# Patient Record
Sex: FEMALE | Race: WHITE | Employment: STUDENT | URBAN - METROPOLITAN AREA
[De-identification: names, ages, dates, MRNs, and addresses within clinical notes are randomized per-mention and may not be internally consistent; named-entity substitution may affect disease eponyms.]

---

## 2023-12-04 ENCOUNTER — HOSPITAL ENCOUNTER (EMERGENCY)
Age: 28
Discharge: HOME | End: 2023-12-05
Attending: EMERGENCY MEDICINE
Payer: COMMERCIAL

## 2023-12-04 ENCOUNTER — APPOINTMENT (OUTPATIENT)
Dept: ULTRASOUND IMAGING | Age: 28
End: 2023-12-04
Payer: COMMERCIAL

## 2023-12-04 DIAGNOSIS — O20.0 THREATENED MISCARRIAGE: Primary | ICD-10-CM

## 2023-12-04 LAB
BILIRUB UR QL STRIP.AUTO: NEGATIVE
CLARITY UR REFRACT.AUTO: CLEAR
COLOR UR AUTO: YELLOW
GLUCOSE UR STRIP.AUTO-MCNC: 250 MG/DL
NITRITE UR QL STRIP.AUTO: NEGATIVE
PH UR STRIP.AUTO: 6.5 [PH] (ref 5–8)
PROT UR STRIP.AUTO-MCNC: NEGATIVE MG/DL
SP GR UR STRIP.AUTO: 1.02 (ref 1–1.03)
UROBILINOGEN UR STRIP.AUTO-MCNC: 0.2 MG/DL

## 2023-12-04 PROCEDURE — 87086 URINE CULTURE/COLONY COUNT: CPT | Performed by: EMERGENCY MEDICINE

## 2023-12-04 PROCEDURE — 81001 URINALYSIS AUTO W/SCOPE: CPT | Performed by: EMERGENCY MEDICINE

## 2023-12-04 PROCEDURE — 87086 URINE CULTURE/COLONY COUNT: CPT

## 2023-12-04 PROCEDURE — 81001 URINALYSIS AUTO W/SCOPE: CPT

## 2023-12-04 PROCEDURE — 99284 EMERGENCY DEPT VISIT MOD MDM: CPT

## 2023-12-04 PROCEDURE — 99285 EMERGENCY DEPT VISIT HI MDM: CPT

## 2023-12-04 PROCEDURE — 81015 MICROSCOPIC EXAM OF URINE: CPT

## 2023-12-04 PROCEDURE — 96374 THER/PROPH/DIAG INJ IV PUSH: CPT

## 2023-12-04 RX ORDER — CHOLECALCIFEROL (VITAMIN D3) 25 MCG
1 TABLET,CHEWABLE ORAL DAILY
COMMUNITY

## 2023-12-05 ENCOUNTER — APPOINTMENT (OUTPATIENT)
Dept: ULTRASOUND IMAGING | Age: 28
End: 2023-12-05
Attending: EMERGENCY MEDICINE
Payer: COMMERCIAL

## 2023-12-05 VITALS
HEIGHT: 63 IN | OXYGEN SATURATION: 98 % | RESPIRATION RATE: 18 BRPM | SYSTOLIC BLOOD PRESSURE: 124 MMHG | HEART RATE: 92 BPM | BODY MASS INDEX: 34.2 KG/M2 | TEMPERATURE: 99 F | DIASTOLIC BLOOD PRESSURE: 80 MMHG | WEIGHT: 193 LBS

## 2023-12-05 LAB
ANTIBODY SCREEN: NEGATIVE
B-HCG SERPL-ACNC: ABNORMAL MIU/ML
BASOPHILS # BLD AUTO: 0.05 X10(3) UL (ref 0–0.2)
BASOPHILS NFR BLD AUTO: 0.4 %
EOSINOPHIL # BLD AUTO: 0.09 X10(3) UL (ref 0–0.7)
EOSINOPHIL NFR BLD AUTO: 0.7 %
ERYTHROCYTE [DISTWIDTH] IN BLOOD BY AUTOMATED COUNT: 12.5 %
HCT VFR BLD AUTO: 36.2 %
HGB BLD-MCNC: 12.6 G/DL
IMM GRANULOCYTES # BLD AUTO: 0.09 X10(3) UL (ref 0–1)
IMM GRANULOCYTES NFR BLD: 0.7 %
LYMPHOCYTES # BLD AUTO: 1.67 X10(3) UL (ref 1–4)
LYMPHOCYTES NFR BLD AUTO: 13.3 %
MCH RBC QN AUTO: 29 PG (ref 26–34)
MCHC RBC AUTO-ENTMCNC: 34.8 G/DL (ref 31–37)
MCV RBC AUTO: 83.2 FL
MONOCYTES # BLD AUTO: 0.68 X10(3) UL (ref 0.1–1)
MONOCYTES NFR BLD AUTO: 5.4 %
NEUTROPHILS # BLD AUTO: 10.02 X10 (3) UL (ref 1.5–7.7)
NEUTROPHILS # BLD AUTO: 10.02 X10(3) UL (ref 1.5–7.7)
NEUTROPHILS NFR BLD AUTO: 79.5 %
PLATELET # BLD AUTO: 226 10(3)UL (ref 150–450)
RBC # BLD AUTO: 4.35 X10(6)UL
RH BLOOD TYPE: NEGATIVE
WBC # BLD AUTO: 12.6 X10(3) UL (ref 4–11)

## 2023-12-05 PROCEDURE — 85025 COMPLETE CBC W/AUTO DIFF WBC: CPT | Performed by: EMERGENCY MEDICINE

## 2023-12-05 PROCEDURE — 84702 CHORIONIC GONADOTROPIN TEST: CPT | Performed by: EMERGENCY MEDICINE

## 2023-12-05 PROCEDURE — 86900 BLOOD TYPING SEROLOGIC ABO: CPT | Performed by: EMERGENCY MEDICINE

## 2023-12-05 PROCEDURE — 76811 OB US DETAILED SNGL FETUS: CPT | Performed by: EMERGENCY MEDICINE

## 2023-12-05 PROCEDURE — 86850 RBC ANTIBODY SCREEN: CPT | Performed by: EMERGENCY MEDICINE

## 2023-12-05 PROCEDURE — 86901 BLOOD TYPING SEROLOGIC RH(D): CPT | Performed by: EMERGENCY MEDICINE

## 2023-12-05 PROCEDURE — 76801 OB US < 14 WKS SINGLE FETUS: CPT | Performed by: EMERGENCY MEDICINE

## 2023-12-05 NOTE — ED INITIAL ASSESSMENT (HPI)
Patient presents with vaginal bleeding/spotting. States she noticed clots today while wiping after using the restroom. Denies abd pain/cramping    13 Weeks pregnant.

## 2023-12-19 ENCOUNTER — HOSPITAL ENCOUNTER (EMERGENCY)
Facility: HOSPITAL | Age: 28
Discharge: HOME | End: 2023-12-19
Attending: EMERGENCY MEDICINE
Payer: COMMERCIAL

## 2023-12-19 VITALS
BODY MASS INDEX: 34 KG/M2 | OXYGEN SATURATION: 98 % | DIASTOLIC BLOOD PRESSURE: 70 MMHG | HEART RATE: 91 BPM | WEIGHT: 190 LBS | SYSTOLIC BLOOD PRESSURE: 126 MMHG | TEMPERATURE: 98 F | RESPIRATION RATE: 20 BRPM

## 2023-12-19 DIAGNOSIS — O03.9 MISCARRIAGE: Primary | ICD-10-CM

## 2023-12-19 LAB
ALBUMIN SERPL-MCNC: 3.2 G/DL (ref 3.4–5)
ALBUMIN/GLOB SERPL: 1 {RATIO} (ref 1–2)
ALP LIVER SERPL-CCNC: 72 U/L
ALT SERPL-CCNC: 24 U/L
ANION GAP SERPL CALC-SCNC: 6 MMOL/L (ref 0–18)
ANTIBODY SCREEN: POSITIVE
AST SERPL-CCNC: 11 U/L (ref 15–37)
BASOPHILS # BLD AUTO: 0.05 X10(3) UL (ref 0–0.2)
BASOPHILS NFR BLD AUTO: 0.4 %
BILIRUB SERPL-MCNC: 0.5 MG/DL (ref 0.1–2)
BUN BLD-MCNC: 4 MG/DL (ref 9–23)
CALCIUM BLD-MCNC: 8.9 MG/DL (ref 8.5–10.1)
CHLORIDE SERPL-SCNC: 108 MMOL/L (ref 98–112)
CO2 SERPL-SCNC: 23 MMOL/L (ref 21–32)
CREAT BLD-MCNC: 0.45 MG/DL
EGFRCR SERPLBLD CKD-EPI 2021: 135 ML/MIN/1.73M2 (ref 60–?)
EOSINOPHIL # BLD AUTO: 0.07 X10(3) UL (ref 0–0.7)
EOSINOPHIL NFR BLD AUTO: 0.5 %
ERYTHROCYTE [DISTWIDTH] IN BLOOD BY AUTOMATED COUNT: 12.2 %
GLOBULIN PLAS-MCNC: 3.3 G/DL (ref 2.8–4.4)
GLUCOSE BLD-MCNC: 102 MG/DL (ref 70–99)
HCT VFR BLD AUTO: 36.4 %
HGB BLD-MCNC: 12.7 G/DL
IMM GRANULOCYTES # BLD AUTO: 0.08 X10(3) UL (ref 0–1)
IMM GRANULOCYTES NFR BLD: 0.6 %
LYMPHOCYTES # BLD AUTO: 1.68 X10(3) UL (ref 1–4)
LYMPHOCYTES NFR BLD AUTO: 11.8 %
MCH RBC QN AUTO: 28.9 PG (ref 26–34)
MCHC RBC AUTO-ENTMCNC: 34.9 G/DL (ref 31–37)
MCV RBC AUTO: 82.9 FL
MONOCYTES # BLD AUTO: 0.61 X10(3) UL (ref 0.1–1)
MONOCYTES NFR BLD AUTO: 4.3 %
NEUTROPHILS # BLD AUTO: 11.76 X10 (3) UL (ref 1.5–7.7)
NEUTROPHILS # BLD AUTO: 11.76 X10(3) UL (ref 1.5–7.7)
NEUTROPHILS NFR BLD AUTO: 82.4 %
OSMOLALITY SERPL CALC.SUM OF ELEC: 281 MOSM/KG (ref 275–295)
PLATELET # BLD AUTO: 226 10(3)UL (ref 150–450)
POTASSIUM SERPL-SCNC: 3.4 MMOL/L (ref 3.5–5.1)
PROT SERPL-MCNC: 6.5 G/DL (ref 6.4–8.2)
RBC # BLD AUTO: 4.39 X10(6)UL
RH BLOOD TYPE: NEGATIVE
SODIUM SERPL-SCNC: 137 MMOL/L (ref 136–145)
WBC # BLD AUTO: 14.3 X10(3) UL (ref 4–11)

## 2023-12-19 PROCEDURE — 86870 RBC ANTIBODY IDENTIFICATION: CPT | Performed by: EMERGENCY MEDICINE

## 2023-12-19 PROCEDURE — 86900 BLOOD TYPING SEROLOGIC ABO: CPT | Performed by: EMERGENCY MEDICINE

## 2023-12-19 PROCEDURE — 86901 BLOOD TYPING SEROLOGIC RH(D): CPT | Performed by: EMERGENCY MEDICINE

## 2023-12-19 PROCEDURE — 86850 RBC ANTIBODY SCREEN: CPT | Performed by: EMERGENCY MEDICINE

## 2023-12-19 PROCEDURE — 96360 HYDRATION IV INFUSION INIT: CPT

## 2023-12-19 PROCEDURE — 85025 COMPLETE CBC W/AUTO DIFF WBC: CPT | Performed by: EMERGENCY MEDICINE

## 2023-12-19 PROCEDURE — 99284 EMERGENCY DEPT VISIT MOD MDM: CPT

## 2023-12-19 PROCEDURE — 99285 EMERGENCY DEPT VISIT HI MDM: CPT

## 2023-12-19 PROCEDURE — 80053 COMPREHEN METABOLIC PANEL: CPT | Performed by: EMERGENCY MEDICINE

## 2023-12-19 NOTE — ED INITIAL ASSESSMENT (HPI)
Pt presents to ed ambulatory with steady gait c/o vaginal bleeding and passing tissue, states she is 15 weeks pregnant

## 2023-12-19 NOTE — DISCHARGE INSTRUCTIONS
Call Dr. Susan Bolton office in the morning, give them an update about your condition and they will decide on a plan going forward.

## 2024-08-03 ENCOUNTER — HOSPITAL ENCOUNTER (EMERGENCY)
Age: 29
Discharge: HOME | End: 2024-08-03
Attending: EMERGENCY MEDICINE
Payer: COMMERCIAL

## 2024-08-03 ENCOUNTER — APPOINTMENT (OUTPATIENT)
Dept: ULTRASOUND IMAGING | Age: 29
End: 2024-08-03
Attending: NURSE PRACTITIONER
Payer: COMMERCIAL

## 2024-08-03 VITALS
WEIGHT: 190 LBS | HEIGHT: 63 IN | HEART RATE: 94 BPM | SYSTOLIC BLOOD PRESSURE: 120 MMHG | TEMPERATURE: 99 F | DIASTOLIC BLOOD PRESSURE: 79 MMHG | RESPIRATION RATE: 16 BRPM | BODY MASS INDEX: 33.66 KG/M2 | OXYGEN SATURATION: 100 %

## 2024-08-03 DIAGNOSIS — O20.9 VAGINAL BLEEDING IN PREGNANCY, FIRST TRIMESTER (HCC): Primary | ICD-10-CM

## 2024-08-03 LAB
ALBUMIN SERPL-MCNC: 4 G/DL (ref 3.4–5)
ALBUMIN/GLOB SERPL: 1.1 {RATIO} (ref 1–2)
ALP LIVER SERPL-CCNC: 75 U/L
ALT SERPL-CCNC: 46 U/L
ANION GAP SERPL CALC-SCNC: 8 MMOL/L (ref 0–18)
AST SERPL-CCNC: 24 U/L (ref 15–37)
B-HCG SERPL-ACNC: 9868 MIU/ML
BASOPHILS # BLD AUTO: 0.13 X10(3) UL (ref 0–0.2)
BASOPHILS NFR BLD AUTO: 0.9 %
BILIRUB SERPL-MCNC: 0.6 MG/DL (ref 0.1–2)
BUN BLD-MCNC: 11 MG/DL (ref 9–23)
CALCIUM BLD-MCNC: 9.8 MG/DL (ref 8.5–10.1)
CHLORIDE SERPL-SCNC: 104 MMOL/L (ref 98–112)
CO2 SERPL-SCNC: 22 MMOL/L (ref 21–32)
CREAT BLD-MCNC: 0.58 MG/DL
EGFRCR SERPLBLD CKD-EPI 2021: 127 ML/MIN/1.73M2 (ref 60–?)
EOSINOPHIL # BLD AUTO: 0.12 X10(3) UL (ref 0–0.7)
EOSINOPHIL NFR BLD AUTO: 0.8 %
ERYTHROCYTE [DISTWIDTH] IN BLOOD BY AUTOMATED COUNT: 12.3 %
GLOBULIN PLAS-MCNC: 3.6 G/DL (ref 2.8–4.4)
GLUCOSE BLD-MCNC: 99 MG/DL (ref 70–99)
HCT VFR BLD AUTO: 39.7 %
HGB BLD-MCNC: 13.5 G/DL
IMM GRANULOCYTES # BLD AUTO: 0.19 X10(3) UL (ref 0–1)
IMM GRANULOCYTES NFR BLD: 1.2 %
LYMPHOCYTES # BLD AUTO: 3.11 X10(3) UL (ref 1–4)
LYMPHOCYTES NFR BLD AUTO: 20.4 %
MCH RBC QN AUTO: 28.5 PG (ref 26–34)
MCHC RBC AUTO-ENTMCNC: 34 G/DL (ref 31–37)
MCV RBC AUTO: 83.8 FL
MONOCYTES # BLD AUTO: 0.88 X10(3) UL (ref 0.1–1)
MONOCYTES NFR BLD AUTO: 5.8 %
NEUTROPHILS # BLD AUTO: 10.78 X10 (3) UL (ref 1.5–7.7)
NEUTROPHILS # BLD AUTO: 10.78 X10(3) UL (ref 1.5–7.7)
NEUTROPHILS NFR BLD AUTO: 70.9 %
OSMOLALITY SERPL CALC.SUM OF ELEC: 277 MOSM/KG (ref 275–295)
PLATELET # BLD AUTO: 248 10(3)UL (ref 150–450)
POTASSIUM SERPL-SCNC: 3.7 MMOL/L (ref 3.5–5.1)
PROGEST SERPL-MCNC: 9.81 NG/ML
PROT SERPL-MCNC: 7.6 G/DL (ref 6.4–8.2)
RBC # BLD AUTO: 4.74 X10(6)UL
SODIUM SERPL-SCNC: 134 MMOL/L (ref 136–145)
WBC # BLD AUTO: 15.2 X10(3) UL (ref 4–11)

## 2024-08-03 PROCEDURE — 80053 COMPREHEN METABOLIC PANEL: CPT | Performed by: NURSE PRACTITIONER

## 2024-08-03 PROCEDURE — 84144 ASSAY OF PROGESTERONE: CPT | Performed by: NURSE PRACTITIONER

## 2024-08-03 PROCEDURE — 76817 TRANSVAGINAL US OBSTETRIC: CPT | Performed by: NURSE PRACTITIONER

## 2024-08-03 PROCEDURE — 84702 CHORIONIC GONADOTROPIN TEST: CPT | Performed by: NURSE PRACTITIONER

## 2024-08-03 PROCEDURE — 99284 EMERGENCY DEPT VISIT MOD MDM: CPT

## 2024-08-03 PROCEDURE — 85025 COMPLETE CBC W/AUTO DIFF WBC: CPT | Performed by: NURSE PRACTITIONER

## 2024-08-03 PROCEDURE — 36415 COLL VENOUS BLD VENIPUNCTURE: CPT

## 2024-08-03 PROCEDURE — 76801 OB US < 14 WKS SINGLE FETUS: CPT | Performed by: NURSE PRACTITIONER

## 2024-08-03 PROCEDURE — 96372 THER/PROPH/DIAG INJ SC/IM: CPT

## 2024-08-03 RX ORDER — ENOXAPARIN SODIUM 150 MG/ML
INJECTION SUBCUTANEOUS EVERY 12 HOURS
COMMUNITY

## 2024-08-03 RX ORDER — ASPIRIN 81 MG/1
81 TABLET, CHEWABLE ORAL DAILY
COMMUNITY

## 2024-08-03 NOTE — ED PROVIDER NOTES
Patient Seen in: [facility] In [place]      History     Chief Complaint   Patient presents with    Pregnancy Issues     Stated Complaint: 5 weeks pregnant, bleeding, no cramping - hx of neg blood type and blood disord*    Subjective:   HPI    28-year-old female who states that she is 5 weeks pregnant presents today with complaints of vaginal bleeding that is bright red that started today.  Patient states that in December she spontaneously miscarried at 15 weeks after finding out that she had a clotting disorder.  Patient states that she is on Lovenox and aspirin daily.  Patient states that she is scheduled for her first OB visit on Thursday.  Patient states that this is her second pregnancy.  Patient denies any cramping or sexual intercourse since finding out she was pregnant.    Objective:   Past Medical History:    Quadriceps strain              Past Surgical History:   Procedure Laterality Date    Anesth,surgery of shoulder      D&c of cervix stump      Hysteroscopy      Other surgical history      bicep tendon tear                Social History     Socioeconomic History    Marital status:    Tobacco Use    Smoking status: Never    Smokeless tobacco: Never   Vaping Use    Vaping status: Never Used   Substance and Sexual Activity    Alcohol use: Never    Drug use: Never    Sexual activity: Yes     Partners: Male     Comment: ocp and condoms    Social History Narrative    ** Merged History Encounter **          Social Determinants of Health     Food Insecurity: Low Risk  (10/23/2023)    Received from [facility]    Food Insecurity     Have there been times that your food ran out, and you didn't have money to get more?: No     Are there times that you worry that this might happen?: No   Transportation Needs: Low Risk  (10/23/2023)    Received from [facility]    Transportation  Needs     Do you have trouble getting transportation to medical appointments?: No     How do you normally get to and from your appointments?: Other   Housing Stability: Low Risk  (10/23/2023)    Received from [facility]    Housing Stability     Are you concerned about having a safe and reliable place to live?: No              Review of Systems   Constitutional: Negative.    HENT: Negative.     Eyes: Negative.    Respiratory: Negative.     Cardiovascular: Negative.    Gastrointestinal: Negative.    Endocrine: Negative.    Genitourinary:  Positive for vaginal bleeding.   Musculoskeletal: Negative.    Skin: Negative.    Allergic/Immunologic: Negative.    Neurological: Negative.    Hematological: Negative.    Psychiatric/Behavioral: Negative.         Positive for stated Chief Complaint: Pregnancy Issues    Other systems are as noted in HPI.  Constitutional and vital signs reviewed.      All other systems reviewed and negative except as noted above.    Physical Exam     ED Triage Vitals [08/03/24 1237]   BP (!) 149/95   Pulse 112   Resp 16   Temp 99.1 °F (37.3 °C)   Temp src Temporal   SpO2 100 %   O2 Device None (Room air)       Current Vitals:   Vital Signs  BP: 117/81  Pulse: 94  Resp: 16  Temp: 98.6 °F (37 °C)  Temp src: Oral    Oxygen Therapy  SpO2: 100 %  O2 Device: None (Room air)            Physical Exam  Vitals and nursing note reviewed.   Constitutional:       Appearance: Normal appearance.   HENT:      Head: Normocephalic.   Eyes:      Extraocular Movements: Extraocular movements intact.      Conjunctiva/sclera: Conjunctivae normal.      Pupils: Pupils are equal, round, and reactive to light.   Cardiovascular:      Rate and Rhythm: Regular rhythm. Tachycardia present.      Pulses: Normal pulses.      Heart sounds: Normal heart sounds.   Pulmonary:      Effort: Pulmonary effort is normal.      Breath sounds: Normal breath sounds.   Abdominal:      General:  Abdomen is flat. Bowel sounds are normal.      Palpations: Abdomen is soft.   Genitourinary:     Vagina: Vaginal discharge present.      Comments: Dark clots small present in the vagina.  Cervical os closed.  Musculoskeletal:      Cervical back: Normal range of motion and neck supple.   Neurological:      General: No focal deficit present.      Mental Status: She is alert.   Psychiatric:         Mood and Affect: Mood normal.             ED Course     Labs Reviewed   COMP METABOLIC PANEL (14) - Abnormal; Notable for the following components:       Result Value    Sodium 134 (*)     All other components within normal limits   HCG, BETA SUBUNIT (QUANT PREGNANCY TEST) - Abnormal; Notable for the following components:    Hcg Quantitative 9,868.0 (*)     All other components within normal limits   CBC W/ DIFFERENTIAL - Abnormal; Notable for the following components:    WBC 15.2 (*)     Neutrophil Absolute Prelim 10.78 (*)     Neutrophil Absolute 10.78 (*)     All other components within normal limits   CBC WITH DIFFERENTIAL WITH PLATELET    Narrative:     The following orders were created for panel order CBC With Differential With Platelet.  Procedure                               Abnormality         Status                     ---------                               -----------         ------                     CBC W/ DIFFERENTIAL[[id number]]          Abnormal            Final result                 Please view results for these tests on the individual orders.   PROGESTERONE   RH IMMUNE GLOBULIN   RAINBOW DRAW LAVENDER   RAINBOW DRAW LIGHT GREEN                      MDM      28-year-old female who states that she is 5 weeks pregnant presents today with complaints of vaginal bleeding that is bright red that started today.  Patient states that in December she spontaneously miscarried at 15 weeks after finding out that she had a clotting disorder.  Patient states that she is on Lovenox and aspirin daily.  Patient states that  she is scheduled for her first OB visit on Thursday.  Patient states that this is her second pregnancy.  Patient denies any cramping or sexual intercourse since finding out she was pregnant.  Vital signs: Please see EMR.  Physical exam: Please see exam.  Differential diagnosis: Miscarriage, early implantation bleeding, vaginal discharge.  Recent Results (from the past 24 hour(s))   RAINBOW DRAW LAVENDER    Collection Time: 08/03/24 12:45 PM   Result Value Ref Range    Hold Lavender Auto Resulted    RAINBOW DRAW LIGHT GREEN    Collection Time: 08/03/24 12:45 PM   Result Value Ref Range    Hold Lt Green Auto Resulted    Comp Metabolic Panel (14)    Collection Time: 08/03/24 12:45 PM   Result Value Ref Range    Glucose 99 70 - 99 mg/dL    Sodium 134 (L) 136 - 145 mmol/L    Potassium 3.7 3.5 - 5.1 mmol/L    Chloride 104 98 - 112 mmol/L    CO2 22.0 21.0 - 32.0 mmol/L    Anion Gap 8 0 - 18 mmol/L    BUN 11 9 - 23 mg/dL    Creatinine 0.58 0.55 - 1.02 mg/dL    Calcium, Total 9.8 8.5 - 10.1 mg/dL    Calculated Osmolality 277 275 - 295 mOsm/kg    eGFR-Cr 127 >=60 mL/min/1.73m2    AST 24 15 - 37 U/L    ALT 46 13 - 56 U/L    Alkaline Phosphatase 75 37 - 98 U/L    Bilirubin, Total 0.6 0.1 - 2.0 mg/dL    Total Protein 7.6 6.4 - 8.2 g/dL    Albumin 4.0 3.4 - 5.0 g/dL    Globulin  3.6 2.8 - 4.4 g/dL    A/G Ratio 1.1 1.0 - 2.0   HCG, Beta Subunit (Quant Pregnancy Test)    Collection Time: 08/03/24 12:45 PM   Result Value Ref Range    Hcg Quantitative 9,868.0 (H) <=3.0 mIU/mL   CBC W/ DIFFERENTIAL    Collection Time: 08/03/24 12:45 PM   Result Value Ref Range    WBC 15.2 (H) 4.0 - 11.0 x10(3) uL    RBC 4.74 3.80 - 5.30 x10(6)uL    HGB 13.5 12.0 - 16.0 g/dL    HCT 39.7 35.0 - 48.0 %    .0 150.0 - 450.0 10(3)uL    MCV 83.8 80.0 - 100.0 fL    MCH 28.5 26.0 - 34.0 pg    MCHC 34.0 31.0 - 37.0 g/dL    RDW 12.3 %    Neutrophil Absolute Prelim 10.78 (H) 1.50 - 7.70 x10 (3) uL    Neutrophil Absolute 10.78 (H) 1.50 - 7.70 x10(3) uL     Lymphocyte Absolute 3.11 1.00 - 4.00 x10(3) uL    Monocyte Absolute 0.88 0.10 - 1.00 x10(3) uL    Eosinophil Absolute 0.12 0.00 - 0.70 x10(3) uL    Basophil Absolute 0.13 0.00 - 0.20 x10(3) uL    Immature Granulocyte Absolute 0.19 0.00 - 1.00 x10(3) uL    Neutrophil % 70.9 %    Lymphocyte % 20.4 %    Monocyte % 5.8 %    Eosinophil % 0.8 %    Basophil % 0.9 %    Immature Granulocyte % 1.2 %   Rh Immune Globulin (Product Only) Once    Collection Time: 08/03/24  3:02 PM   Result Value Ref Range    DERIVATIVE CODE RHG     DERIVATIVE LOT [id number]     UNIT ABO      Product Status Issued      Paging patient's OB/GYN Dr. Rojo from [facility] with recommendations on RhoGAM administration or not.  Patient is a negative per chart review.  This is patient's second pregnancy.  Spoke with patient's OB/GYN on-call.  They want us to administer RhoGAM and will do so while she is here in the ER.  Patient is to follow-up with her OB/GYN on her scheduled appointment Thursday.  Will diagnose patient with vaginal bleeding in pregnancy.                                   Medical Decision Making  28-year-old female who states that she is 5 weeks pregnant presents today with complaints of vaginal bleeding that is bright red that started today.  Patient states that in December she spontaneously miscarried at 15 weeks after finding out that she had a clotting disorder.  Patient states that she is on Lovenox and aspirin daily.  Patient states that she is scheduled for her first OB visit on Thursday.  Patient states that this is her second pregnancy.  Patient denies any cramping or sexual intercourse since finding out she was pregnant.    Problems Addressed:  Vaginal bleeding in pregnancy, first trimester (HCC): acute illness or injury    Amount and/or Complexity of Data Reviewed  External Data Reviewed: labs and notes.  Labs: ordered. Decision-making details documented in ED Course.  Radiology: ordered. Decision-making details documented  in ED Course.  ECG/medicine tests: ordered. Decision-making details documented in ED Course.        Disposition and Plan     Clinical Impression:  1. Vaginal bleeding in pregnancy, first trimester (HCC)         Disposition:  Discharge  8/3/2024  3:32 pm    Follow-up:  Collins Bateman DO  [address] [place] [address]  [phone]    Follow up in 1 week(s)            Medications Prescribed:  Current Discharge Medication List

## 2024-08-03 NOTE — ED INITIAL ASSESSMENT (HPI)
Pt is bleeding - states noticed when she peed and wiped bright red blood - denies cramping or pain - states she is on lovenox and aspirin

## 2024-08-03 NOTE — ED QUICK NOTES
Pt was provided discharge papers, advised that we would like to observe post injection until approx. 1550. Pt verbalized understanding.

## 2024-08-08 ENCOUNTER — APPOINTMENT (OUTPATIENT)
Dept: ULTRASOUND IMAGING | Age: 29
End: 2024-08-08
Attending: EMERGENCY MEDICINE
Payer: COMMERCIAL

## 2024-08-08 ENCOUNTER — HOSPITAL ENCOUNTER (EMERGENCY)
Age: 29
Discharge: HOME | End: 2024-08-09
Attending: EMERGENCY MEDICINE
Payer: COMMERCIAL

## 2024-08-08 DIAGNOSIS — O20.9 VAGINAL BLEEDING IN PREGNANCY, FIRST TRIMESTER (HCC): Primary | ICD-10-CM

## 2024-08-08 LAB
B-HCG SERPL-ACNC: ABNORMAL MIU/ML
BASOPHILS # BLD AUTO: 0.13 X10(3) UL (ref 0–0.2)
BASOPHILS NFR BLD AUTO: 0.9 %
EOSINOPHIL # BLD AUTO: 0.11 X10(3) UL (ref 0–0.7)
EOSINOPHIL NFR BLD AUTO: 0.8 %
ERYTHROCYTE [DISTWIDTH] IN BLOOD BY AUTOMATED COUNT: 12.4 %
HCT VFR BLD AUTO: 42.5 %
HGB BLD-MCNC: 14.3 G/DL
IMM GRANULOCYTES # BLD AUTO: 0.11 X10(3) UL (ref 0–1)
IMM GRANULOCYTES NFR BLD: 0.8 %
LYMPHOCYTES # BLD AUTO: 2.77 X10(3) UL (ref 1–4)
LYMPHOCYTES NFR BLD AUTO: 19.3 %
MCH RBC QN AUTO: 28.8 PG (ref 26–34)
MCHC RBC AUTO-ENTMCNC: 33.6 G/DL (ref 31–37)
MCV RBC AUTO: 85.7 FL
MONOCYTES # BLD AUTO: 0.81 X10(3) UL (ref 0.1–1)
MONOCYTES NFR BLD AUTO: 5.6 %
NEUTROPHILS # BLD AUTO: 10.45 X10 (3) UL (ref 1.5–7.7)
NEUTROPHILS # BLD AUTO: 10.45 X10(3) UL (ref 1.5–7.7)
NEUTROPHILS NFR BLD AUTO: 72.6 %
PLATELET # BLD AUTO: 230 10(3)UL (ref 150–450)
RBC # BLD AUTO: 4.96 X10(6)UL
WBC # BLD AUTO: 14.4 X10(3) UL (ref 4–11)

## 2024-08-08 PROCEDURE — 99284 EMERGENCY DEPT VISIT MOD MDM: CPT

## 2024-08-08 PROCEDURE — 96360 HYDRATION IV INFUSION INIT: CPT

## 2024-08-08 PROCEDURE — 76817 TRANSVAGINAL US OBSTETRIC: CPT | Performed by: EMERGENCY MEDICINE

## 2024-08-08 PROCEDURE — 84702 CHORIONIC GONADOTROPIN TEST: CPT | Performed by: EMERGENCY MEDICINE

## 2024-08-08 PROCEDURE — 76801 OB US < 14 WKS SINGLE FETUS: CPT | Performed by: EMERGENCY MEDICINE

## 2024-08-08 PROCEDURE — 85025 COMPLETE CBC W/AUTO DIFF WBC: CPT | Performed by: EMERGENCY MEDICINE

## 2024-08-08 PROCEDURE — 99285 EMERGENCY DEPT VISIT HI MDM: CPT

## 2024-08-09 VITALS
DIASTOLIC BLOOD PRESSURE: 78 MMHG | WEIGHT: 190.06 LBS | OXYGEN SATURATION: 100 % | BODY MASS INDEX: 34 KG/M2 | RESPIRATION RATE: 16 BRPM | TEMPERATURE: 98 F | HEART RATE: 80 BPM | SYSTOLIC BLOOD PRESSURE: 120 MMHG

## 2024-08-09 NOTE — DISCHARGE INSTRUCTIONS
Pelvic rest  No douching, tampons, or intercourse  Tylenol for pain  Push fluids  Continue prenatal vitamin    My recommendation would be against foreign travel until cleared by your gynecologist    Contact your gynecologist in the morning to arrange close follow-up

## 2024-08-09 NOTE — ED PROVIDER NOTES
Patient was signed out pending ultrasound.  She is a . She had an ultrasound here on Saturday.  Her ultrasound demonstrated a suspected IUP however fetal heart tones were not noted.  Patient followed up with her obstetrician earlier today and was prescribed  Progesterone.  Her quant went from 9800-27,000.  Ultrasound demonstrates an IUP.  Patient was given RhoGAM during her previous ER visit.  On my encounter, patient is well-appearing and in no acute distress.  I reviewed findings with patient, and recommended pelvic rest, no insertion of anything into the vagina along with close OB follow-up.  Patient vocalized understanding, will DC.    US PREG 1ST TRIM W/EV (CPT=76801/28419)    Result Date: 2024  PROCEDURE:   OB W/ EV 1ST TRIMESTER (CPT=76801/38894)  COMPARISON:  [facility] ,  PREG 1ST TRIM W/EV (CPT=76801/61561), 2024, 1:07 PM.  [facility] ,  PREG LESS THAN 14 WEEKS (TRANSABDOMINAL) (CPT=76801), 2023, 0:08 AM.  INDICATIONS:  Pt approx. 6 weeks pregnant, here for repeat vaginal bleeding. Pt was seen recently for same had follow up appt. at OB earlier today.  TECHNIQUE:  Transabdominal and endovaginal pelvic ultrasound examinations were performed.  PATIENT STATED HISTORY: (As transcribed by Technologist)  Patient presents with vaginal bleeding. Patient is 6 weeks pregnant.    TRANSABDOMINAL ULTRASOUND: UTERUS: The uterus measures is 10.0 x 5.7 x 6.2 cm. .  TRANSVAGINAL ULTRASOUND: UTERUS: A single intrauterine pregnancy is present.  The mean gestational age by ultrasound is 6 weeks, 1 days.  The expected due date by ultrasound estimation is 2029.  Clinical age is 9 weeks, 5 days. Gestational sac is ovoid and normal in shape for estimaged gestational age.  There are good fetal heart tones identified.  The fetal heart rate is measured at 121 beats per minute.  Small subchorionic hematoma noted measuring 1.5 x 1.0 x 0.9 cm.  No significant free pelvic fluid.  OVARIES:  RIGHT OVARY:  Right ovary is visualized and  measures 4.2 x 3.2 x 3.3 cm.  Corpus luteal cyst measuring 2.3 x 2.1 x 1.7 cm.  LEFT OVARY: Left ovary is visualized and measures 3.7 x 2.0 x 2.1 cm.  Follicles within the ovary  Normal flow is documented with color Doppler imaging.            CONCLUSION: 1. Single live intrauterine pregnancy with mean gestational age by ultrasound at 6 weeks,  1 days +/- 4 days with expected delivery date of 2025 .  Dates are out of proportion to given gestational age by LMP of 9 weeks 5 days.  2. Small subchorionic hematoma measuring 1.5 cm.  Repeat evaluation on follow-up ultrasound is recommended.   LOCATION:  [facility]   Dictated by (CST): Mario Vee MD on 2024 at 11:12 PM     Finalized by (CST): Mario Vee MD on 2024 at 11:15 PM       US PREG 1ST TRIM W/EV (CPT=76801/55396)    Result Date: 8/3/2024  PROCEDURE:  US OB W/ EV 1ST TRIMESTER (CPT=76801/45212)  COMPARISON:  None.  INDICATIONS:  5 weeks pregnant, bleeding, no cramping - hx of neg blood type and blood disorder - is on lovenox and asa,  hx of miscarriage at 15 weeks  TECHNIQUE:  Transabdominal and endovaginal pelvic ultrasound examinations were performed.  PATIENT STATED HISTORY: (As transcribed by Technologist)  Patient complains of bleeding in pregnancy.   FINDINGS:  UTERUS:  Measures 8.9 x 7.0 x 5.0 cm.  Bicornuate uterus.  There is a probable early pregnancy within the right uterine cornea demonstrating a yolk sac but no definite fetal pole at this time.  There is a mean sac diameter of approximately 10 mm with estimated ultrasound age of 5 weeks 4 days plus/-4 days.  A nonviable pregnancy is not entirely excluded.  Clinical correlation is suggested with followup serial beta-hCG levels and repeat imaging as clinically directed.  Obstetric consultation is suggested. PLACENTA:  Not seen at this gestational age  RIGHT OVARY:  Measuring 3.6 x 3.0 x 2.7 cm.  Probable corpus luteal cyst in the right ovary  measuring 20 x 21 x 19 mm.  Blood flow demonstrated the right ovary.  LEFT OVARY:  Measuring 3.2 x 2.7 x 1.6 cm with unremarkable appearance and blood flow demonstrated. CUL-DE-SAC:  Small amount of nonspecific free fluid extending into the right adnexa CLINICAL AGE:  Unknown SONOGRAPHIC AGE:  5 weeks 4 days +/-4 days OTHER:  Negative.            CONCLUSION:   1. Bicornuate uterus.  There is a probable early pregnancy within the right uterine cornea demonstrating a yolk sac but no definite fetal pole at this time.  There is a mean sac diameter of approximately 10 mm with estimated ultrasound age of 5 weeks 4 days plus/-4 days.  A nonviable pregnancy is not entirely excluded.  Clinical correlation is suggested with followup serial beta-hCG levels and repeat imaging as clinically directed.  Obstetric consultation is suggested.   2. 21 mm probable corpus luteal cyst in the right ovary.  3. Small amount of nonspecific free fluid in the pelvis may be physiologic.   Please see above for further details.  LOCATION:  [id number]   Dictated by (CST): Herminio Kirkpatrick MD on 8/03/2024 at 1:50 PM     Finalized by (CST): eHrminio Kirkpatrick MD on 8/03/2024 at 1:53 PM

## 2024-08-09 NOTE — ED PROVIDER NOTES
Patient Seen in: [facility] In [place]      History     Chief Complaint   Patient presents with    Pregnancy Issues     Stated Complaint: Pt approx. 6 weeks pregnant, here for repeat vaginal bleeding. Pt was seen rece*    Subjective:   HPI    Patient -0-1-0 with last menstrual period 2024 had an office visit today.  She was to the emergency department 5 days ago with some vaginal bleeding.    Beta-hCG was 9868.  Blood type A-.  Patient treated with RhoGAM.  Ultrasound at that time:   CONCLUSION:    1. Bicornuate uterus.  There is a probable early pregnancy within the right uterine cornea demonstrating a yolk sac but no definite fetal pole at this time.  There is a mean sac diameter of approximately 10 mm with estimated ultrasound age of 5 weeks 4 days plus/-4 days.  A nonviable pregnancy is not entirely excluded.  Clinical correlation is suggested with followup serial beta-hCG levels and repeat imaging as clinically directed.  Obstetric consultation is suggested.    2. 21 mm probable corpus luteal cyst in the right ovary.   3. Small amount of nonspecific free fluid in the pelvis may be physiologic   Progesterone was low and she was started on vaginal progesterone.  Patient reports vaginal bleeding today.  She reports enough to soak 1 pad.  She had \"twinges\" of pain across the lower abdomen.  No extraordinary pain at this point.  Patient reports that she and her significant other are going on a cruise tomorrow.  She would like repeat laboratory test and ultrasound imaging before she decides whether or not to leave on the trip             Objective:   Past Medical History:    Quadriceps strain              Past Surgical History:   Procedure Laterality Date    Anesth,surgery of shoulder      D&c of cervix stump      Hysteroscopy      Other surgical history      bicep tendon tear                Social History     Socioeconomic History    Marital status:    Tobacco Use    Smoking  status: Never    Smokeless tobacco: Never   Vaping Use    Vaping status: Never Used   Substance and Sexual Activity    Alcohol use: Never    Drug use: Never    Sexual activity: Yes     Partners: Male     Comment: ocp and condoms    Social History Narrative    ** Merged History Encounter **          Social Determinants of Health     Food Insecurity: Low Risk  (10/23/2023)    Received from [facility]    Food Insecurity     Have there been times that your food ran out, and you didn't have money to get more?: No     Are there times that you worry that this might happen?: No   Transportation Needs: Low Risk  (10/23/2023)    Received from [facility]    Transportation Needs     Do you have trouble getting transportation to medical appointments?: No     How do you normally get to and from your appointments?: Other   Housing Stability: Low Risk  (10/23/2023)    Received from [facility]    Housing Stability     Are you concerned about having a safe and reliable place to live?: No              Review of Systems    Positive for stated Chief Complaint: Pregnancy Issues    Other systems are as noted in HPI.  Constitutional and vital signs reviewed.      All other systems reviewed and negative except as noted above.    Physical Exam     ED Triage Vitals [08/08/24 1928]   /83   Pulse 85   Resp 17   Temp 98.2 °F (36.8 °C)   Temp src Oral   SpO2 98 %   O2 Device None (Room air)       Current Vitals:   Vital Signs  BP: 123/83  Pulse: 85  Resp: 17  Temp: 98.2 °F (36.8 °C)  Temp src: Oral    Oxygen Therapy  SpO2: 98 %  O2 Device: None (Room air)            Physical Exam  general: The patient is awake, alert, conversant.   Eyes: sclera white  Lids and lashes are normal.  Abdomen: Soft, nondistended.  Completely nontender  Extremities: Unremarkable.  Calves  nonswollen, symmetric, nontender.  No pedal edema.  Skin: Not particularly pale  Neurologic:  Mental status as above.  Patient moves all extremities with good strength and coordination.         ED Course     Labs Reviewed   HCG, BETA SUBUNIT (QUANT PREGNANCY TEST) - Abnormal; Notable for the following components:       Result Value    Hcg Quantitative 27,293.0 (*)     All other components within normal limits   CBC WITH DIFFERENTIAL WITH PLATELET - Abnormal; Notable for the following components:    WBC 14.4 (*)     Neutrophil Absolute Prelim 10.45 (*)     Neutrophil Absolute 10.45 (*)     All other components within normal limits   SCAN SLIDE                      MDM      Patient with hCG greater than 2000 but no IUP with fetal heart tones identified on ultrasound imaging a week ago.  With the ongoing vaginal bleeding, evolving miscarriage include in the differential.  Ectopic pregnancy would seem unlikely with intrauterine pregnancy identified  Threatened miscarriage also include in the differential    Patient treated with IV fluid  CBC shows white count 14 with hemoglobin 14 similar to previous of 15.2 and adequate platelets  Beta-hCG 27,293 (previous Beta-hCG was 9868)     Ultrasound pelvis  Pending    Case signed out to my colleague at end of shift to make final disposition                                   Medical Decision Making      Disposition and Plan     Clinical Impression:  1. Vaginal bleeding in pregnancy, first trimester (HCC)         Disposition:  There is no disposition on file for this visit.  There is no disposition time on file for this visit.    Follow-up:  No follow-up provider specified.        Medications Prescribed:  Current Discharge Medication List

## 2024-08-09 NOTE — ED INITIAL ASSESSMENT (HPI)
Pt approx. 6 weeks pregnant, here for repeat vaginal bleeding. Pt was seen recently for same had follow up appt. at OB earlier today.